# Patient Record
Sex: FEMALE | Race: ASIAN | URBAN - METROPOLITAN AREA
[De-identification: names, ages, dates, MRNs, and addresses within clinical notes are randomized per-mention and may not be internally consistent; named-entity substitution may affect disease eponyms.]

---

## 2019-05-23 ENCOUNTER — OFFICE VISIT (OUTPATIENT)
Dept: URBAN - METROPOLITAN AREA CLINIC 88 | Facility: CLINIC | Age: 69
End: 2019-05-23
Payer: COMMERCIAL

## 2019-05-23 DIAGNOSIS — H25.13 AGE-RELATED NUCLEAR CATARACT, BILATERAL: ICD-10-CM

## 2019-05-23 PROCEDURE — 99214 OFFICE O/P EST MOD 30 MIN: CPT | Performed by: OPHTHALMOLOGY

## 2019-05-23 ASSESSMENT — INTRAOCULAR PRESSURE
OS: 17
OD: 17

## 2019-05-23 ASSESSMENT — KERATOMETRY
OS: 43.63
OD: 43.25

## 2019-05-23 NOTE — IMPRESSION/PLAN
Impression: Type 2 diab with mild nonp rtnop without macular edema, bi: V06.5991. Condition: established, stable. Symptoms: will continue to monitor. Plan: Discussed diagnosis in detail with patient. Discussed ocular and systemic benefits of blood sugar control. Keep future appts. with PCP. No treatment necessary at this time. Patient was instructed to monitor vision for sudden changes and to call if visual changes noted.

## 2021-07-27 ENCOUNTER — OFFICE VISIT (OUTPATIENT)
Dept: URBAN - METROPOLITAN AREA CLINIC 88 | Facility: CLINIC | Age: 71
End: 2021-07-27
Payer: MEDICARE

## 2021-07-27 DIAGNOSIS — H25.12 AGE-RELATED NUCLEAR CATARACT, LEFT EYE: ICD-10-CM

## 2021-07-27 DIAGNOSIS — H25.811 COMBINED FORMS OF AGE-RELATED CATARACT, RIGHT EYE: ICD-10-CM

## 2021-07-27 DIAGNOSIS — E11.3293 TYPE 2 DIAB WITH MILD NONP RTNOP WITHOUT MACULAR EDEMA, BI: Primary | ICD-10-CM

## 2021-07-27 PROCEDURE — 99214 OFFICE O/P EST MOD 30 MIN: CPT | Performed by: OPHTHALMOLOGY

## 2021-07-27 ASSESSMENT — KERATOMETRY
OS: 43.63
OD: 43.13

## 2021-07-27 ASSESSMENT — VISUAL ACUITY
OD: 20/40
OS: 20/25

## 2021-07-27 ASSESSMENT — INTRAOCULAR PRESSURE
OS: 14
OD: 14

## 2021-07-27 NOTE — IMPRESSION/PLAN
Impression: Type 2 diab with mild nonp rtnop without macular edema, bi: I60.0241. Plan: Discussed diagnosis in detail with patient. Discussed ocular and systemic benefits of blood sugar control. Keep future appts. with PCP. No treatment necessary at this time. Patient was instructed to monitor vision for sudden changes and to call if visual changes noted.

## 2021-07-27 NOTE — IMPRESSION/PLAN
Impression: Combined forms of age-related cataract, right eye: H25.811. Plan: Cataracts account for the patient's complaints. Discussed all risks, benefits, procedures and recovery. Patient understands changing glasses will not improve vision. Patient desires to have surgery, recommend phacoemulsification with intraocular lens. Surgical risks and benefits were discussed, explained and to patient. RL2 Schedule Cataract sx OD. Final post operative refractive decision will be made by Operative Surgeon. Pred-Moxi-Nepafenac/generic drops/dexycu Patient will call to schedule Ascan and Cataract sx OD.